# Patient Record
(demographics unavailable — no encounter records)

---

## 2025-05-09 NOTE — HISTORY OF PRESENT ILLNESS
[de-identified] : 39-year-old female who had a band placed in 2006 in [place].  She had a history of a slipped band that was replaced at some point in the past.  She lost a lot of weight and had an abdominoplasty.  The last time she had any fluid removed was in 2015 she thinks and at that time she thinks she had 10 mL within the band.  She recently had a baby and during her pregnancy she had a lot of reflux symptoms.  Since delivery 10 weeks ago she noticed that she started to have worsening reflux and regurgitation of food after she lays down at night.  She also sometimes has epigastric pain and chills.  She has not had the band evaluated or emptied since 2015.

## 2025-05-09 NOTE — PLAN
[FreeTextEntry1] : Discussed modifying her diet to eat moist foods and small meals, chew well. Fluid emptied from her band today, likely still has sick cc within the band Trial pantoprazole daily Upper GI to evaluate positioning of band and swallowing Will call with results and next steps

## 2025-05-09 NOTE — ASSESSMENT
[FreeTextEntry1] : 39-year-old female with a history of a gastric band placement now with worsening regurgitation and reflux symptoms, 4 cc of fluid aspirated from the band today

## 2025-05-13 NOTE — PHYSICAL EXAM
[Normal] : affect appropriate [de-identified] : NAD [de-identified] : JOSELINE hernández/l. [de-identified] : Supple. [de-identified] : Normal respiratory effort [de-identified] : RRR [de-identified] : Soft, nontender. Port site accessed and 4 cc fluid removed, tolerated well. [de-identified] : Moves all extremities.

## 2025-05-13 NOTE — ASSESSMENT
[FreeTextEntry1] : 39F with PMH gastric lap band placement 2007 with slippage 2012 that was redone at that time here for evaluation of regurgitation.

## 2025-05-13 NOTE — HISTORY OF PRESENT ILLNESS
[de-identified] : 39F with PMH gastric lap band placement 2007 with slippage 2012 that was redone at that time here for evaluation. [de-identified] : Reports food regurgitation, worse when lying flat Has not had an XR in a long time Is 10 weeks postpartum and felt a change in her symptoms with worsening regurgitation since delivery Notices low grade fever the day after these symptoms and takes tylenol for those symptoms which helps, resolves after 1 day Had band placed in [place] She lost 110 pounds since her band placement, original weight 240 She does not feel restriction with the band, she states she feels full when she eats too quickly She does not vomit often, but does normally know what provoked it Sleeps on a wedge Takes tums at night NKDA PSH: carpal tunnel bilaterally, lap band placement and revision

## 2025-05-13 NOTE — PLAN
[FreeTextEntry1] : Upper GI study Abdominal XR to evaluate for orientation and location of the band Further management pending results of imaging Will prescribe protonix daily for reflux Re-evaluate symptoms after removal of fluid from the band and after imaging results

## 2025-05-13 NOTE — PHYSICAL EXAM
[Normal] : affect appropriate [de-identified] : NAD [de-identified] : JOSELINE hernández/l. [de-identified] : Supple. [de-identified] : Normal respiratory effort [de-identified] : RRR [de-identified] : Soft, nontender. Port site accessed and 4 cc fluid removed, tolerated well. [de-identified] : Moves all extremities.

## 2025-05-13 NOTE — HISTORY OF PRESENT ILLNESS
[de-identified] : 39F with PMH gastric lap band placement 2007 with slippage 2012 that was redone at that time here for evaluation. [de-identified] : Reports food regurgitation, worse when lying flat Has not had an XR in a long time Is 10 weeks postpartum and felt a change in her symptoms with worsening regurgitation since delivery Notices low grade fever the day after these symptoms and takes tylenol for those symptoms which helps, resolves after 1 day Had band placed in [place] She lost 110 pounds since her band placement, original weight 240 She does not feel restriction with the band, she states she feels full when she eats too quickly She does not vomit often, but does normally know what provoked it Sleeps on a wedge Takes tums at night NKDA PSH: carpal tunnel bilaterally, lap band placement and revision